# Patient Record
Sex: MALE | Race: OTHER | HISPANIC OR LATINO | Employment: FULL TIME | URBAN - METROPOLITAN AREA
[De-identification: names, ages, dates, MRNs, and addresses within clinical notes are randomized per-mention and may not be internally consistent; named-entity substitution may affect disease eponyms.]

---
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
## 2018-04-22 ENCOUNTER — HOSPITAL ENCOUNTER (EMERGENCY)
Facility: HOSPITAL | Age: 18
Discharge: HOME | End: 2018-04-22
Attending: EMERGENCY MEDICINE | Admitting: EMERGENCY MEDICINE
Payer: COMMERCIAL

## 2018-04-22 VITALS
HEART RATE: 48 BPM | SYSTOLIC BLOOD PRESSURE: 113 MMHG | TEMPERATURE: 97.9 F | WEIGHT: 130 LBS | DIASTOLIC BLOOD PRESSURE: 56 MMHG | RESPIRATION RATE: 18 BRPM | OXYGEN SATURATION: 100 %

## 2018-04-22 DIAGNOSIS — L02.01 FACIAL ABSCESS: Primary | ICD-10-CM

## 2018-04-22 PROCEDURE — 99282 EMERGENCY DEPT VISIT SF MDM: CPT

## 2018-04-22 RX ORDER — LIDOCAINE HYDROCHLORIDE AND EPINEPHRINE 10; 10 MG/ML; UG/ML
1 INJECTION, SOLUTION INFILTRATION; PERINEURAL ONCE
Status: COMPLETED | OUTPATIENT
Start: 2018-04-22 | End: 2018-04-22

## 2018-04-22 RX ORDER — DOXYCYCLINE HYCLATE 100 MG/1
100 CAPSULE ORAL 2 TIMES DAILY
Qty: 14 CAPSULE | Refills: 0 | Status: SHIPPED | OUTPATIENT
Start: 2018-04-22 | End: 2018-04-29

## 2018-04-22 RX ADMIN — LIDOCAINE HYDROCHLORIDE AND EPINEPHRINE 1 ML: 10; 10 INJECTION, SOLUTION INFILTRATION; PERINEURAL at 12:20

## 2018-04-22 NOTE — Clinical Note
Ac Colon discharge to home/self care  Condition at discharge: Good    Return precautions were discussed with patient  Patient understands when to return to  Emergency department  Patient agrees to discharge plan and follow up care

## 2018-04-22 NOTE — DISCHARGE INSTRUCTIONS
Abscess   WHAT YOU NEED TO KNOW:   A warm compress may help your abscess drain  Your healthcare provider may make a cut in the abscess so it can drain  You may need surgery to remove an abscess that is on your hands or buttocks  DISCHARGE INSTRUCTIONS:   Return to the emergency department if:   · The area around your abscess becomes very painful, warm, or has red streaks  · You have a fever and chills  · Your heart is beating faster than usual      · You feel faint or confused  Contact your healthcare provider if:   · Your abscess gets bigger or does not get better  · Your abscess returns  · You have questions or concerns about your condition or care  Medicines: You may  need any of the following:  · Antibiotics  help treat a bacterial infection  · Acetaminophen  decreases pain and fever  It is available without a doctor's order  Ask how much to take and how often to take it  Follow directions  Acetaminophen can cause liver damage if not taken correctly  · NSAIDs , such as ibuprofen, help decrease swelling, pain, and fever  This medicine is available with or without a doctor's order  NSAIDs can cause stomach bleeding or kidney problems in certain people  If you take blood thinner medicine, always ask your healthcare provider if NSAIDs are safe for you  Always read the medicine label and follow directions  · Take your medicine as directed  Contact your healthcare provider if you think your medicine is not helping or if you have side effects  Tell him or her if you are allergic to any medicine  Keep a list of the medicines, vitamins, and herbs you take  Include the amounts, and when and why you take them  Bring the list or the pill bottles to follow-up visits  Carry your medicine list with you in case of an emergency  Self-care:   · Apply a warm compress to your abscess  This will help it open and drain  Wet a washcloth in warm, but not hot, water  Apply the compress for 10 minutes  Repeat this 4 times each day  Do not  press on an abscess or try to open it with a needle  You may push the bacteria deeper or into your blood  · Do not share your clothes, towels, or sheets with anyone  This can spread the infection to others  · Wash your hands often  This can help prevent the spread of germs  Use soap and water or an alcohol-based hand rub  Care for your wound after it is drained:   · Care for your wound as directed  If your healthcare provider says it is okay, carefully remove the bandage and gauze packing  You may need to soak the gauze to get it out of your wound  Clean your wound and the area around it as directed  Dry the area and put on new, clean bandages  Change your bandages when they get wet or dirty  · Ask your healthcare provider how to change the gauze in your wound  Keep track of how many pieces of gauze are placed inside the wound  Do not put too much packing in the wound  Do not pack the gauze too tightly in your wound  Follow up with your healthcare provider in 1 to 3 days: You may need to have your packing removed or your bandage changed  Write down your questions so you remember to ask them during your visits  © 2017 [address]  Information is for End User's use only and may not be sold, redistributed or otherwise used for commercial purposes  All illustrations and images included in CareNotes® are the copyrighted property of [employer] , [employer]  or [employer]  The above information is an  only  It is not intended as medical advice for individual conditions or treatments  Talk to your doctor, nurse or pharmacist before following any medical regimen to see if it is safe and effective for you

## 2018-04-22 NOTE — ED ATTENDING ATTESTATION
Cassandra Lantigua MD, saw and evaluated the patient  I have discussed the patient with the resident/non-physician practitioner and agree with the resident's/non-physician practitioner's findings, Plan of Care, and MDM as documented in the resident's/non-physician practitioner's note, except where noted  All available labs and Radiology studies were reviewed  At this point I agree with the current assessment done in the Emergency Department  I have conducted an independent evaluation of this patient a history and physical is as follows:Tender mass right cheek   Abscess R cheek      Critical Care Time  CritCare Time    Procedures

## 2018-12-17 ENCOUNTER — APPOINTMENT (EMERGENCY)
Dept: RADIOLOGY | Facility: HOSPITAL | Age: 18
End: 2018-12-17

## 2018-12-17 ENCOUNTER — HOSPITAL ENCOUNTER (EMERGENCY)
Facility: HOSPITAL | Age: 18
Discharge: HOME | End: 2018-12-17
Attending: EMERGENCY MEDICINE | Admitting: EMERGENCY MEDICINE

## 2018-12-17 VITALS
DIASTOLIC BLOOD PRESSURE: 70 MMHG | BODY MASS INDEX: 18.68 KG/M2 | TEMPERATURE: 99.5 F | HEIGHT: 70 IN | SYSTOLIC BLOOD PRESSURE: 124 MMHG | WEIGHT: 130.5 LBS | HEART RATE: 74 BPM | RESPIRATION RATE: 18 BRPM | OXYGEN SATURATION: 100 %

## 2018-12-17 DIAGNOSIS — S62.102A LEFT WRIST FRACTURE, CLOSED, INITIAL ENCOUNTER: Primary | ICD-10-CM

## 2018-12-17 PROCEDURE — 99284 EMERGENCY DEPT VISIT MOD MDM: CPT

## 2018-12-17 PROCEDURE — 96374 THER/PROPH/DIAG INJ IV PUSH: CPT

## 2018-12-17 PROCEDURE — 73130 X-RAY EXAM OF HAND: CPT

## 2018-12-17 PROCEDURE — 96376 TX/PRO/DX INJ SAME DRUG ADON: CPT

## 2018-12-17 PROCEDURE — 96375 TX/PRO/DX INJ NEW DRUG ADDON: CPT

## 2018-12-17 RX ORDER — KETOROLAC TROMETHAMINE 30 MG/ML
30 INJECTION, SOLUTION INTRAMUSCULAR; INTRAVENOUS ONCE
Status: COMPLETED | OUTPATIENT
Start: 2018-12-17 | End: 2018-12-17

## 2018-12-17 RX ORDER — OXYCODONE HYDROCHLORIDE AND ACETAMINOPHEN 5; 325 MG/1; MG/1
1 TABLET ORAL EVERY 4 HOURS PRN
Qty: 15 TABLET | Refills: 0 | Status: SHIPPED | OUTPATIENT
Start: 2018-12-17 | End: 2018-12-20 | Stop reason: ALTCHOICE

## 2018-12-17 RX ORDER — MORPHINE SULFATE 4 MG/ML
4 INJECTION, SOLUTION INTRAMUSCULAR; INTRAVENOUS ONCE
Status: COMPLETED | OUTPATIENT
Start: 2018-12-17 | End: 2018-12-17

## 2018-12-17 RX ORDER — IBUPROFEN 600 MG/1
600 TABLET ORAL EVERY 6 HOURS PRN
Qty: 30 TABLET | Refills: 0 | Status: SHIPPED | OUTPATIENT
Start: 2018-12-17 | End: 2019-04-08

## 2018-12-17 RX ADMIN — MORPHINE SULFATE 4 MG: 4 INJECTION INTRAVENOUS at 22:14

## 2018-12-17 RX ADMIN — MORPHINE SULFATE 4 MG: 4 INJECTION INTRAVENOUS at 21:31

## 2018-12-17 RX ADMIN — KETOROLAC TROMETHAMINE 30 MG: 30 INJECTION, SOLUTION INTRAMUSCULAR at 22:09

## 2018-12-18 NOTE — ED NOTES
Sugar Tong splint applied by Dr Wen Comes to pt 's left lower hand/wrist/arm     Kajal Murrieta RN  12/17/18 0367

## 2018-12-18 NOTE — DISCHARGE INSTRUCTIONS
Arm Fracture in Adults   WHAT YOU NEED TO KNOW:   An arm fracture is a crack or break in one or more of the bones in your arm  An arm fracture may be caused by a fall onto an outstretched hand  It may also be caused by trauma from a car accident or a sports injury  Osteoporosis (brittle bones) can increase your risk for a fracture  DISCHARGE INSTRUCTIONS:   Seek care immediately if:   · The pain in your injured arm does not get better or gets worse, even after you rest and take medicine  · Your injured arm, hand, or fingers feel numb  · Your arm is swollen, red, and feels warm  · Your skin over the arm fracture is swollen, cold, or pale  · You cannot move your arm, hand, or fingers  Contact your healthcare provider if:   · You have a fever  · Your brace or splint becomes wet, damaged, comes off  · You have questions or concerns about your injury, treatment, or care  Medicines:   · NSAIDs , such as ibuprofen, help decrease swelling and pain  This medicine is available with or without a doctor's order  NSAIDs can cause stomach bleeding or kidney problems in certain people  If you take blood thinner medicine, always ask your healthcare provider if NSAIDs are safe for you  Always read the medicine label and follow directions  · Acetaminophen  decreases pain  It is available without a doctor's order  Ask how much to take and how often to take it  Follow directions  Acetaminophen can cause liver damage if not taken correctly  · Prescription pain medicine  may be given  Ask how to take this medicine safely  · Take your medicine as directed  Contact your healthcare provider if you think your medicine is not helping or if you have side effects  Tell him or her if you are allergic to any medicine  Keep a list of the medicines, vitamins, and herbs you take  Include the amounts, and when and why you take them  Bring the list or the pill bottles to follow-up visits   Carry your medicine list with you in case of an emergency  Follow up with your healthcare provider within 1 week: You may need to see a bone specialist within 3 to 4 days if you need surgery or further treatment for your arm fracture  Write down your questions so you remember to ask them during your visits  Rest:  You should rest your arm as much as possible  Ask your healthcare provider when you can put pressure or weight on your arm  Also ask when you can return to sports or vigorous exercises  Ice:  Apply ice on your arm for 15 to 20 minutes every hour or as directed  Use an ice pack, or put crushed ice in a plastic bag  Cover it with a towel  Ice helps prevent tissue damage and decreases swelling and pain  Elevate:  Elevate your arm above the level of your heart as often as you can  This will help decrease swelling and pain  Prop your arm on pillows or blankets to keep it elevated comfortably  Care for your cast or splint:  Ask your healthcare provider when it is okay to bathe  Do not get your cast or splint wet  Before you take a bath or shower, cover your cast or splint with a plastic bag  Tape the bag to your skin to help keep water out  Hold your arm away from the water in case the bag leaks  · Check the skin around your cast or splint each day for any redness or open skin  · Do not use a sharp or pointed object to scratch your skin under the cast or splint  Physical therapy:  A physical therapist teaches you exercises to help improve movement and strength, and to decrease pain  © 2017 [address] Information is for End User's use only and may not be sold, redistributed or otherwise used for commercial purposes  All illustrations and images included in CareNotes® are the copyrighted property of [employer]  or [address]  The above information is an  only  It is not intended as medical advice for individual conditions or treatments   Talk to your doctor, nurse or pharmacist before following any medical regimen to see if it is safe and effective for you

## 2018-12-18 NOTE — ED PROVIDER NOTES
Pt Name: Zachery Ferreira  MRN: [id number]  [place] 2000  Age/Sex: 19 y o  male  Date of evaluation: 12/17/2018  PCP: No primary care provider on file  CHIEF COMPLAINT    Chief Complaint   Patient presents with    Wrist Injury     I was skate boarding and slipped and caught myself and all my weight went onto my left hand  HPI    Doolres Gann presents to the Emergency Department complaining of left wrist pain after falling while skateboarding  HPI      Past Medical and Surgical History    Past Medical History:   Diagnosis Date    Asthma        Past Surgical History:   Procedure Laterality Date    NO PAST SURGERIES         History reviewed  No pertinent family history  Social History   Substance Use Topics    Smoking status: Never Smoker    Smokeless tobacco: Never Used    Alcohol use No              Allergies    No Known Allergies    Home Medications    Prior to Admission medications    Not on File           Review of Systems    Review of Systems   Constitutional: Negative for activity change, appetite change, chills, fatigue and fever  HENT: Negative for congestion, rhinorrhea, sinus pressure, sneezing, sore throat and trouble swallowing  Eyes: Negative for photophobia and visual disturbance  Respiratory: Negative for chest tightness, shortness of breath and wheezing  Cardiovascular: Negative for chest pain and leg swelling  Gastrointestinal: Negative for abdominal distention, abdominal pain, constipation, diarrhea, nausea and vomiting  Endocrine: Negative for polydipsia, polyphagia and polyuria  Genitourinary: Negative for decreased urine volume, difficulty urinating, dysuria, flank pain, frequency and urgency  Musculoskeletal: Negative for back pain, gait problem, joint swelling and neck pain  Skin: Negative for color change, pallor and rash  Allergic/Immunologic: Negative for immunocompromised state     Neurological: Negative for seizures, syncope, speech difficulty, weakness, light-headedness and headaches  Psychiatric/Behavioral: Negative for confusion  All other systems reviewed and are negative  All other systems reviewed and negative  Physical Exam      ED Triage Vitals [12/17/18 2053]   Temperature Pulse Respirations Blood Pressure SpO2   99 5 °F (37 5 °C) 80 20 135/74 100 %      Temp Source Heart Rate Source Patient Position - Orthostatic VS BP Location FiO2 (%)   Tympanic Monitor Sitting Right arm --      Pain Score       Worst Possible Pain               Physical Exam   Constitutional: He is oriented to person, place, and time  He appears well-developed and well-nourished  No distress  HENT:   Head: Normocephalic and atraumatic  Nose: Nose normal    Mouth/Throat: Oropharynx is clear and moist    Eyes: Pupils are equal, round, and reactive to light  Conjunctivae and EOM are normal    Neck: Normal range of motion  Neck supple  Cardiovascular: Normal rate, regular rhythm and normal heart sounds  Exam reveals no gallop and no friction rub  No murmur heard  Pulmonary/Chest: Effort normal and breath sounds normal  No respiratory distress  He has no wheezes  He has no rales  Abdominal: Soft  Bowel sounds are normal  There is no tenderness  There is no rebound and no guarding  Musculoskeletal:        Left wrist: He exhibits decreased range of motion, tenderness, bony tenderness, swelling and deformity  Left hand: He exhibits tenderness, bony tenderness, deformity and swelling  Normal sensation noted  Decreased sensation is not present in the ulnar distribution, is not present in the medial redistribution and is not present in the radial distribution  Normal strength noted  He exhibits no finger abduction, no thumb/finger opposition and no wrist extension trouble  Hands:  Neurological: He is alert and oriented to person, place, and time  Skin: Skin is warm and dry  He is not diaphoretic     Psychiatric: He has a normal mood and affect  His behavior is normal    Nursing note and vitals reviewed  Assessment and Plan    Ac Payton is a 19 y o  male who presents with obvious left wrist fracture  Physical examination remarkable for same  Differential diagnosis (not completely inclusive) includes associated dislocation/ hematma  Plan will be to perform diagnostic testing and treat symptomatically  MDM    Diagnostic Result      Labs: All labs reviewed and utilized in the medical decision making process    Radiology:    XR hand 3+ vw left   Final Result      Acute fractures of the distal left radius and ulna, as described above  Please see discussion  The images are available for clinical review  The study was marked in [facility] for immediate notification              Workstation performed: [id number]             All radiology studies independently viewed by me and interpreted by the radiologist     Procedure    Static Splint Application  Date/Time: 12/18/2018 10:38 AM  Performed by: Dorothy Grayson  Authorized by: Dorothy Grayson     Patient location:  ED  Procedure performed by emergency physician: Yes    Other Assisting Provider: Yes (comment) (cem husain)    Consent:     Consent obtained:  Verbal    Consent given by:  Patient  Indication:     Indications: fracture    Pre-procedure details:     Sensation:  Normal  Procedure details:     Laterality:  Left    Location:  Wrist    Wrist:  L wrist    Splint type:  Sugar tong    Supplies:  Cotton padding, elastic bandage and Ortho-Glass  Post-procedure details:     Pain:  Improved    Sensation:  Normal    Neurovascular Exam: skin pink, capillary refill <2 sec and normal pulses          CritCare Time      ED Course of Care and Re-Assessments        Medications   morphine (PF) 4 mg/mL injection 4 mg (4 mg Intravenous Given 12/17/18 2131)   morphine (PF) 4 mg/mL injection 4 mg (4 mg Intravenous Given 12/17/18 2214)   ketorolac (TORADOL) injection 30 mg (30 mg Intravenous Given 12/17/18 2209)           FINAL IMPRESSION    Final diagnoses:   Left wrist fracture, closed, initial encounter         DISPOSITION/PLAN    Time reflects when diagnosis was documented in both MDM as applicable and the Disposition within this note     Time User Action Codes Description Comment    12/17/2018 10:56 PM Kala Burton Add [R14 102A] Left wrist fracture, closed, initial encounter       ED Disposition     ED Disposition Condition Comment    Discharge  Ac Colon discharge to home/self care      Condition at discharge: Good        Follow-up Information     Follow up With Specialties Details Why Contact Info Additional [address] Heart Emergency Department Emergency Medicine Go to As needed, If symptoms worsen [address] [address]  [facility] Orthopedic Surgery Schedule an appointment as soon as possible for a visit  Adan Gupta [phone]  [address] [place] [address], [phone]    [facility] Heart Emergency Department Emergency Medicine   [address] [address]  [place]  [address] Heart Orthopedic Surgery Schedule an appointment as soon as possible for a visit  [address]  [address]  [address] [place] [address], [address], [place], [address]            PATIENT REFERRED TO:    [facility] Emergency Department  [address] [address] [phone]  Go to  As needed, If symptoms worsen    [address]  Adan Gupta [address]  [phone]  Schedule an appointment as soon as possible for a visit      [facility] Emergency Department  [address] [address]  [address]  [address] [phone]  Schedule an appointment as soon as possible for a visit        DISCHARGE MEDICATIONS:    Discharge Medication List as of 12/17/2018 10:58 PM      START taking these medications    Details   ibuprofen (MOTRIN) 600 mg tablet Take 1 tablet (600 mg total) by mouth every 6 (six) hours as needed for mild pain, Starting Mon 12/17/2018, Print      oxyCODONE-acetaminophen (PERCOCET) 5-325 mg per tablet Take 1 tablet by mouth every 4 (four) hours as needed for moderate pain for up to 10 days Max Daily Amount: 6 tablets, Starting Mon 12/17/2018, Until Thu 12/27/2018, Print             No discharge procedures on file           Kesha Adam, [address] [place] [address],   12/18/18 3163

## 2018-12-20 ENCOUNTER — OFFICE VISIT (OUTPATIENT)
Dept: OBGYN CLINIC | Facility: HOSPITAL | Age: 18
End: 2018-12-20

## 2018-12-20 ENCOUNTER — HOSPITAL ENCOUNTER (OUTPATIENT)
Dept: RADIOLOGY | Facility: HOSPITAL | Age: 18
Discharge: HOME | End: 2018-12-20

## 2018-12-20 VITALS
DIASTOLIC BLOOD PRESSURE: 72 MMHG | HEART RATE: 53 BPM | WEIGHT: 134 LBS | SYSTOLIC BLOOD PRESSURE: 134 MMHG | HEIGHT: 70 IN | BODY MASS INDEX: 19.18 KG/M2

## 2018-12-20 DIAGNOSIS — M25.532 LEFT WRIST PAIN: ICD-10-CM

## 2018-12-20 DIAGNOSIS — S52.502A CLOSED FRACTURE OF DISTAL ENDS OF LEFT RADIUS AND ULNA, INITIAL ENCOUNTER: ICD-10-CM

## 2018-12-20 DIAGNOSIS — S52.602A CLOSED FRACTURE OF DISTAL ENDS OF LEFT RADIUS AND ULNA, INITIAL ENCOUNTER: ICD-10-CM

## 2018-12-20 DIAGNOSIS — M25.532 LEFT WRIST PAIN: Primary | ICD-10-CM

## 2018-12-20 PROCEDURE — 73110 X-RAY EXAM OF WRIST: CPT

## 2018-12-20 PROCEDURE — 29075 APPL CST ELBW FNGR SHORT ARM: CPT | Performed by: PHYSICIAN ASSISTANT

## 2018-12-20 PROCEDURE — 99203 OFFICE O/P NEW LOW 30 MIN: CPT | Performed by: PHYSICIAN ASSISTANT

## 2018-12-20 RX ORDER — HYDROCODONE BITARTRATE AND ACETAMINOPHEN 5; 325 MG/1; MG/1
1 TABLET ORAL EVERY 8 HOURS PRN
Qty: 15 TABLET | Refills: 0 | Status: SHIPPED | OUTPATIENT
Start: 2018-12-20 | End: 2019-04-08

## 2018-12-20 NOTE — PATIENT INSTRUCTIONS
Call or go to ER immediately if any returning / worsening numbness  Cast Care   WHAT YOU NEED TO KNOW:   Cast care will help the cast dry and harden correctly, and then protect it until it comes off  Your cast may need up to 48 hours to dry and harden completely  Even after your cast hardens, it can be damaged  DISCHARGE INSTRUCTIONS:   Return to the emergency department if:   · Your cast breaks or gets damaged  · You see drainage, or your cast is stained or smells bad  · Your skin turns blue or pale  · Your skin tingles, burns, or is cold or numb  · You have severe pain that is getting worse and does not go away after you take pain medicine  · Your limb swells, or your cast looks or feels tighter than it was before  Contact your healthcare provider if:   · Something falls into your cast and gets stuck  · You have itching, pain, burning, or weakness where you have the cast      · You have a fever  · You have sores, blisters, or breaks on the skin around the edges of the cast     · You have questions or concerns about your condition or care  Follow up with your healthcare provider as directed: You will need to return to have your cast removed and your bones checked  Write down your questions so you remember to ask them during your visits  Care for your cast while it hardens:   · Protect the cast   Do not put weight on the cast  Do not bend, lean on, or hit the cast with anything  Use the palms of your hands when you move the cast  Do not use your fingers  Your fingers may leave marks on the cast as it dries  · Change positions often  Change your position every 2 hours to help the cast dry faster  Prop your cast on something soft, such as a pillow, to prevent a flat area on your cast      · Keep the cast dry  Tie plastic trash bags around your cast to keep it dry while you bathe  You may use a blow dryer on cool or the lowest heat setting to dry your cast if it gets wet   Do not use a high heat setting, because you may burn your skin  Certain casts can get wet  Ask if you have a waterproof cast   Care for your cast after it hardens:   · Check your cast every day  Contact your healthcare provider if you notice any cracks, dents, holes, or flaking on your cast      · Keep your cast clean and dry  Cover your cast with a towel when you eat  You may have a small piece of cast that can be removed to check on incisions under your cast  Make sure the small piece of cast is kept tightly closed  If your cast gets dirty, use a mild detergent and a damp washcloth to wipe off the outside of your cast  Continue to cover your cast with trash bags to keep it dry while you bathe  · Care for the edges of your cast   Cover the cast edges to keep them smooth  Use 4 inch pieces of waterproof tape  Place one end of the tape under the inside edge of your cast and fold it over to the outside surface  Overlap tape strips until the edges are completely covered  Change the tape as directed  Do not pull or repair any of the padding from inside the cast  This could cause blisters and sores on the skin under your cast      · Keep weight off your cast   Do not let anyone push down or lean on your cast  This may cause it to break  · Do not use sharp objects  Do not use a sharp or pointed object to scratch under your cast  This may cause wounds that can get infected, or you may lose the item inside the cast  If your skin itches, blow cool air under the cast  You may also gently scratch your skin outside the cast with a cloth  © 2017 [address] Information is for End User's use only and may not be sold, redistributed or otherwise used for commercial purposes  All illustrations and images included in CareNotes® are the copyrighted property of [employer]  or [employer]  The above information is an  only   It is not intended as medical advice for individual conditions or treatments  Talk to your doctor, nurse or pharmacist before following any medical regimen to see if it is safe and effective for you

## 2018-12-20 NOTE — PROGRESS NOTES
Assessment/Plan   Diagnoses and all orders for this visit:    Left wrist pain  -     XR wrist 3+ vw left; Future    Closed fracture of distal ends of left radius and ulna, initial encounter       -     Short arm cast       -     Since it remains non-displaced, will treat conservatively with a cast, but will schedule close clinical follow up because it is somewhat comminuted  -     Call immediately if any returning / worsening numbness       -     Follow up with Dr Rebeca Nick (pt request)      *Checked PDMP  No record of pt, but ER rx oxycodone #15 3 days ago  Subjective   Patient ID: He Epperson is a 19 y o  male  Vitals:    12/20/18 1106   BP: 134/72   Pulse: (!) 48     19yo male comes in for an evaluation of his left wrist   He was injured 12/17/18 when he fell off his skateboard and landed on his outstretched hand  He went to the ER where xrays showed a comminuted fracture of the distal radius and a small ulnar styloid avulsion  He was treated with a sugar tong splint  He c/o minimal, intermittent numbness in all 5 fingers  The pain is sharp in character, moderate in severity, pain does not radiate and is not associated with numbness  He lives near [facility] and requests to f/u there  The following portions of the patient's history were reviewed and updated as appropriate: allergies, current medications, past family history, past medical history, past social history, past surgical history and problem list     Review of Systems  Ortho Exam  Past Medical History:   Diagnosis Date    Asthma      Past Surgical History:   Procedure Laterality Date    NO PAST SURGERIES       History reviewed  No pertinent family history  Social History     Occupational History    Not on file       Social History Main Topics    Smoking status: Never Smoker    Smokeless tobacco: Never Used    Alcohol use No    Drug use: Yes     Types: Marijuana      Comment: Occasionally    Sexual activity: Not on file       Review of Systems   Constitutional: Negative  HENT: Negative  Eyes: Negative  Respiratory: Negative  Cardiovascular: Negative  Gastrointestinal: Negative  Endocrine: Negative  Genitourinary: Negative  Musculoskeletal: As below      Allergic/Immunologic: Negative  Neurological: Negative  Hematological: Negative  Psychiatric/Behavioral: Negative  Objective   Physical Exam    · Constitutional: Awake, Alert, Oriented  · Eyes: EOMI  · Psych: Mood and affect appropriate  · Heart: regular rate and rhythm  · Lungs: No audible wheezing  · Abdomen: soft  · Lymph: no lymphedema   left wrist:  - Appearance   Swelling: significant wrist and dorsal hand, no discoloration, no ecchymosis and no erythema  Possible deformity dorsoradial wrist   - Palpation  o +++ wrist tenderness  - ROM  o not examined due to fracture status  - Motor  o not examined due to fracture status  - Special Tests  o normal sensation of hand and arm, no CTS  - NVI distally    I have personally reviewed pertinent films in PACS and my interpretation is Unchanged fracture  Non-displaced        Cast application  Date/Time: 12/20/2018 11:46 AM  Performed by: Cori Michael  Authorized by: Cori Michael     Consent:     Consent obtained:  Verbal    Consent given by:  Patient    Risks discussed:  Discoloration, numbness, pain and swelling    Alternatives discussed:  No treatment  Pre-procedure details:     Sensation:  Normal  Procedure details:     Laterality:  Left    Location:  Wrist    Wrist:  L wristCast type:  Short arm    Supplies:  Cotton padding and fiberglass  Post-procedure details:     Pain:  Improved    Sensation:  Normal    Patient tolerance of procedure:   Tolerated well, no immediate complications

## 2019-01-22 ENCOUNTER — OFFICE VISIT (OUTPATIENT)
Dept: OBGYN CLINIC | Facility: CLINIC | Age: 19
End: 2019-01-22

## 2019-01-22 ENCOUNTER — HOSPITAL ENCOUNTER (OUTPATIENT)
Dept: RADIOLOGY | Facility: HOSPITAL | Age: 19
Discharge: HOME | End: 2019-01-22
Attending: ORTHOPAEDIC SURGERY

## 2019-01-22 VITALS — HEIGHT: 70 IN | WEIGHT: 134 LBS | BODY MASS INDEX: 19.18 KG/M2

## 2019-01-22 DIAGNOSIS — M25.532 PAIN IN LEFT WRIST: ICD-10-CM

## 2019-01-22 DIAGNOSIS — S52.602D: Primary | ICD-10-CM

## 2019-01-22 DIAGNOSIS — S52.502D: Primary | ICD-10-CM

## 2019-01-22 PROCEDURE — 73110 X-RAY EXAM OF WRIST: CPT

## 2019-01-22 PROCEDURE — 99213 OFFICE O/P EST LOW 20 MIN: CPT | Performed by: ORTHOPAEDIC SURGERY

## 2019-01-22 NOTE — PATIENT INSTRUCTIONS
Plan:  I removed his cast now and placed him in  a cock-up wrist splint which he must wear full time for another 2 weeks  He may take it off for bathing and then put the splint back on  I do not want him doing any heavy lifting yet as the fracture is not totally healed  He may continue to use ice for 15 min 4 times a day if needed for pain and can take Advil, Aleve, or Tylenol if needed  I encouraged him strongly to get his fingers moving into a fist 5 times every hour while awake to prevent stiffness    I will see him in 2 weeks for splint off and repeat x-ray

## 2019-01-22 NOTE — PROGRESS NOTES
Chief Complaint   Patient presents with    Left Wrist - Fracture         Assessment:  Fracture left distal radius and ulnar styloid    Plan:  I removed his cast now and placed him in  a cock-up wrist splint which he must wear full time for another 2 weeks  He may take it off for bathing and then put the splint back on  I do not want him doing any heavy lifting yet as the fracture is not totally healed  He may continue to use ice for 15 min 4 times a day if needed for pain and can take Advil, Aleve, or Tylenol if needed  I encouraged him strongly to get his fingers moving into a fist 5 times every hour while awake to prevent stiffness  I will see him in 2 weeks for splint off and repeat x-ray    HPI:  This 64-year-old Bangl[place] male injured his left wrist skateboarding and saw our urgent care center  He was casted for the last 5 weeks and is doing well in the cast   He has no pain, no numbness, no tingling or paresthesias  He is right-hand dominant  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  Past medical history, family history,  social history, medication history, and allergies are reviewed  Please see HPI for pertinent review of systems  All other systems reviewed are negative     Exam:   Ht 5' 10" (1 778 m)   Wt 60 8 kg (134 lb)   BMI 19 23 kg/m²   I removed the short-arm cast today from his left wrist   He was  over the distal radius  There was no swelling, ecchymosis or deformity  He had limited motion from immobilization  Radial pulse was intact  Normal sensation and motion in all 5 fingers  Elbow motion is preserved  No evidence of cellulitis in forearm or hand       Studies Reviewed:  X-rays of his left wrist were personally reviewed  The fracture remains essentially nondisplaced there is some early sclerosis but the fracture line is still clearly visible    There is no significant dorsal tilt or shortening

## 2019-02-06 ENCOUNTER — OFFICE VISIT (OUTPATIENT)
Dept: OBGYN CLINIC | Facility: CLINIC | Age: 19
End: 2019-02-06

## 2019-02-06 ENCOUNTER — HOSPITAL ENCOUNTER (OUTPATIENT)
Dept: RADIOLOGY | Facility: HOSPITAL | Age: 19
Discharge: HOME | End: 2019-02-06
Attending: ORTHOPAEDIC SURGERY

## 2019-02-06 VITALS
HEIGHT: 70 IN | SYSTOLIC BLOOD PRESSURE: 110 MMHG | BODY MASS INDEX: 19.18 KG/M2 | DIASTOLIC BLOOD PRESSURE: 70 MMHG | HEART RATE: 69 BPM | WEIGHT: 134 LBS

## 2019-02-06 DIAGNOSIS — S52.602D: ICD-10-CM

## 2019-02-06 DIAGNOSIS — S52.502D: Primary | ICD-10-CM

## 2019-02-06 DIAGNOSIS — S52.502D: ICD-10-CM

## 2019-02-06 DIAGNOSIS — S52.602D: Primary | ICD-10-CM

## 2019-02-06 PROCEDURE — 73110 X-RAY EXAM OF WRIST: CPT

## 2019-02-06 PROCEDURE — 99213 OFFICE O/P EST LOW 20 MIN: CPT | Performed by: ORTHOPAEDIC SURGERY

## 2019-02-06 NOTE — PATIENT INSTRUCTIONS
Plan:   I am happy with the progressive healing seen by x-ray and clinically  He is no longer tender over the fracture site  I stopped to splint  I cautioned him that it may be another month or so until he regains full pain-free motion  For now I would like him to soak the hand and wrist in warm soapy water for 5 minutes twice a day for the next few weeks to start range of motion  I do not want him doing heavy lifting, punching, or overly heavy activities until he regains full pain-free range of motion  I showed him exercises he should do to promote wrist motion  He can use ice for 15 minutes 4 times a day and take Advil, Aleve, or Tylenol if needed for pain  I sent him back to his family doctor for routine care and would be  happy to see him back again if he has further issues

## 2019-02-06 NOTE — PROGRESS NOTES
Chief Complaint   Patient presents with    Left Wrist - Follow-up         Assessment:  Fracture left distal radius and ulnar styloid    Plan:   I am happy with the progressive healing seen by x-ray and clinically  He is no longer tender over the fracture site  I stopped to splint  I cautioned him that it may be another month or so until he regains full pain-free motion  For now I would like him to soak the hand and wrist in warm soapy water for 5 minutes twice a day for the next few weeks to start range of motion  I do not want him doing heavy lifting, punching, or overly heavy activities until he regains full pain-free range of motion  I showed him exercises he should do to promote wrist motion  He can use ice for 15 minutes 4 times a day and take Advil, Aleve, or Tylenol if needed for pain  I sent him back to his family doctor for routine care and would be  happy to see him back again if he has further issues  HPI:  This 28-year-old [place] male injured his left wrist skateboarding on 12/17/2018 and saw our [facility] urgent care center  He was casted for the last 5 weeks and is doing well in the cast   He has no pain, no numbness, no tingling or paresthesias  He is right-hand dominant  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  He has been in a cock-up wrist splint for the last 2 weeks as he has continued to work  He returns now on 02/06/2019  He is now 7 half weeks after fracture of the left distal radius and ulnar styloid  He has been wearing a splint and is not complaining of pain, numbness, tingling, or paresthesias in this hand    Past medical history, family history,  social history, medication history, and allergies are reviewed  Please see HPI for pertinent review of systems    All other systems reviewed are negative     Exam:   /70   Pulse 69   Ht 5' 10" (1 778 m)   Wt 60 8 kg (134 lb)   BMI 19 23 kg/m²   I removed the splint today from his left wrist   He was no longer tender over the distal left radius  There was no swelling, ecchymosis or redness  He had mild limitation of full volar and dorsiflexion secondary to the immobilization  He was nontender now over the distal radius and ulnar styloid  There was no deformity of the wrist  Radial pulse was intact  He had normal sensation and motion in all 5 fingers  Elbow motion in flexion and extension was normal   There was no cellulitis in forearm or hand       Studies Reviewed:  X-rays of his left wrist were personally reviewed  The fracture remains essentially nondisplaced there is some early sclerosis but the fracture line is still clearly visible  There is no significant dorsal tilt or shortening  Repeat x-rays done on 02/06/2019 were reviewed and the fracture line is just about closed down  The fracture itself is only faintly visible  Position is anatomic

## 2019-04-08 ENCOUNTER — HOSPITAL ENCOUNTER (EMERGENCY)
Facility: HOSPITAL | Age: 19
Discharge: HOME | End: 2019-04-08
Attending: EMERGENCY MEDICINE | Admitting: EMERGENCY MEDICINE

## 2019-04-08 VITALS
BODY MASS INDEX: 19.11 KG/M2 | RESPIRATION RATE: 18 BRPM | HEART RATE: 71 BPM | SYSTOLIC BLOOD PRESSURE: 147 MMHG | OXYGEN SATURATION: 99 % | WEIGHT: 133.16 LBS | TEMPERATURE: 98 F | DIASTOLIC BLOOD PRESSURE: 66 MMHG

## 2019-04-08 DIAGNOSIS — N34.2 URETHRITIS: Primary | ICD-10-CM

## 2019-04-08 LAB
BACTERIA UR QL AUTO: ABNORMAL /HPF
BILIRUB UR QL STRIP: NEGATIVE
CLARITY UR: CLEAR
COLOR UR: ABNORMAL
GLUCOSE UR STRIP-MCNC: NEGATIVE MG/DL
HGB UR QL STRIP.AUTO: NEGATIVE
KETONES UR STRIP-MCNC: NEGATIVE MG/DL
LEUKOCYTE ESTERASE UR QL STRIP: NEGATIVE
MUCOUS THREADS UR QL AUTO: ABNORMAL
NITRITE UR QL STRIP: NEGATIVE
NON-SQ EPI CELLS URNS QL MICRO: ABNORMAL /HPF
PH UR STRIP.AUTO: 7 [PH] (ref 4.5–8)
PROT UR STRIP-MCNC: ABNORMAL MG/DL
RBC #/AREA URNS AUTO: ABNORMAL /HPF
SP GR UR STRIP.AUTO: >=1.03 (ref 1–1.03)
UROBILINOGEN UR QL STRIP.AUTO: 0.2 E.U./DL
WBC #/AREA URNS AUTO: ABNORMAL /HPF

## 2019-04-08 PROCEDURE — 96372 THER/PROPH/DIAG INJ SC/IM: CPT

## 2019-04-08 PROCEDURE — 99283 EMERGENCY DEPT VISIT LOW MDM: CPT

## 2019-04-08 PROCEDURE — 99284 EMERGENCY DEPT VISIT MOD MDM: CPT | Performed by: PHYSICIAN ASSISTANT

## 2019-04-08 PROCEDURE — 87591 N.GONORRHOEAE DNA AMP PROB: CPT | Performed by: PHYSICIAN ASSISTANT

## 2019-04-08 PROCEDURE — 87491 CHLMYD TRACH DNA AMP PROBE: CPT | Performed by: PHYSICIAN ASSISTANT

## 2019-04-08 PROCEDURE — 81001 URINALYSIS AUTO W/SCOPE: CPT

## 2019-04-08 RX ORDER — AZITHROMYCIN 250 MG/1
1000 TABLET, FILM COATED ORAL ONCE
Status: COMPLETED | OUTPATIENT
Start: 2019-04-08 | End: 2019-04-08

## 2019-04-08 RX ADMIN — LIDOCAINE HYDROCHLORIDE 250 MG: 10 INJECTION, SOLUTION EPIDURAL; INFILTRATION; INTRACAUDAL; PERINEURAL at 14:11

## 2019-04-08 RX ADMIN — AZITHROMYCIN 1000 MG: 250 TABLET, FILM COATED ORAL at 14:10

## 2019-04-09 LAB
C TRACH DNA SPEC QL NAA+PROBE: POSITIVE
N GONORRHOEA DNA SPEC QL NAA+PROBE: NEGATIVE

## 2023-01-06 ENCOUNTER — OFFICE VISIT (OUTPATIENT)
Dept: FAMILY MEDICINE CLINIC | Facility: CLINIC | Age: 23
End: 2023-01-06

## 2023-01-06 VITALS
DIASTOLIC BLOOD PRESSURE: 70 MMHG | SYSTOLIC BLOOD PRESSURE: 120 MMHG | HEART RATE: 80 BPM | BODY MASS INDEX: 22.22 KG/M2 | WEIGHT: 150 LBS | HEIGHT: 69 IN | RESPIRATION RATE: 16 BRPM | OXYGEN SATURATION: 98 % | TEMPERATURE: 98.5 F

## 2023-01-06 DIAGNOSIS — S52.602D: ICD-10-CM

## 2023-01-06 DIAGNOSIS — S52.502D: ICD-10-CM

## 2023-01-06 DIAGNOSIS — Z76.89 ENCOUNTER TO ESTABLISH CARE WITH NEW DOCTOR: Primary | ICD-10-CM

## 2023-01-06 DIAGNOSIS — Z23 ENCOUNTER FOR IMMUNIZATION: ICD-10-CM

## 2023-01-06 NOTE — ASSESSMENT & PLAN NOTE
Current Concerns: clearance letter for [employer]     PMH: left radial and ulnar fracture  Medications: None  Allergies: None  Surg Hx: None  Social Hx:   Alcohol: None    Smoking: Vape    Drugs: None   Sexually Active: currently, female, one partner                    Contraceptive: No barrier contraceptive   Education level: High school diploma    Occupation:     Sleep: No issues currently    Eating: No issues currently     Fam Hx: No sig family hx

## 2023-01-06 NOTE — LETTER
To whom this may concern,         Ildefonso Johan is currently under my professional care and he was seen in my office on 1/6/2023  He had been diagnosed with a fracture of left radius, distal, with ulna, closed, in 2018, which has since healed without any current deficits  Mr Yo Johnson has full range of motion, and on examination did not show any problems with wrist strength  If you have any further questions please feel free to call and discuss with me at the office           Sincerely,               India Parks MD

## 2023-01-06 NOTE — ASSESSMENT & PLAN NOTE
2018 s/p fall off skateboard  Patient had been initially seen by ortho  Currently patient has no deficit, has full ROM and strength 5/5   Patient can be cleared for [facility]

## 2023-01-06 NOTE — PROGRESS NOTES
Name: Ayo Lantigua      : 2000      MRN: [id number]  Encounter Provider: Inocencio Lopez MD  Encounter Date: 2023   Encounter department: [address] [place] [address]     1  Encounter to establish care with new doctor  Assessment & Plan:  Current Concerns: clearance letter for [employer]     PMH: left radial and ulnar fracture  Medications: None  Allergies: None  Surg Hx: None  Social Hx:   Alcohol: None    Smoking: Vape    Drugs: None   Sexually Active: currently, female, one partner                    Contraceptive: No barrier contraceptive   Education level: High school diploma    Occupation:     Sleep: No issues currently    Eating: No issues currently     Fam Hx: No sig family hx       2  Encounter for immunization  -     influenza vaccine, quadrivalent, 0 5 mL, preservative-free, for adult and pediatric patients 6 mos+ (AFLURIA, FLUARIX, FLULAVAL, FLUZONE)    3  Fracture of radius, distal, with ulna, closed, left, with routine healing, subsequent encounter  Assessment & Plan:   s/p fall off skateboard  Patient had been initially seen by ortho  Currently patient has no deficit, has full ROM and strength 5/5   Patient can be cleared for  service         Depression Screening and Follow-up Plan: Patient was screened for depression during today's encounter  They screened negative with a PHQ-2 score of 0  Subjective     Ac Payton is a 23 y o  male presents today to establish care  Current concerns include getting cleared for his  enrollment     Review of Systems   Constitutional: Negative for chills and fever  HENT: Negative for ear pain and sore throat  Eyes: Negative for pain and visual disturbance  Respiratory: Negative for cough and shortness of breath  Cardiovascular: Negative for chest pain and palpitations  Gastrointestinal: Negative for abdominal pain and vomiting     Genitourinary: Negative for dysuria and hematuria  Musculoskeletal: Negative for arthralgias and back pain  Skin: Negative for color change and rash  Neurological: Negative for seizures and syncope  All other systems reviewed and are negative  Past Medical History:   Diagnosis Date   • Asthma      Past Surgical History:   Procedure Laterality Date   • NO PAST SURGERIES       No family history on file  Social History     Socioeconomic History   • Marital status: /Civil Union     Spouse name: None   • Number of children: None   • Years of education: None   • Highest education level: None   Occupational History   • None   Tobacco Use   • Smoking status: Never   • Smokeless tobacco: Never   Substance and Sexual Activity   • Alcohol use: No   • Drug use: Yes     Types: Marijuana     Comment: Occasionally   • Sexual activity: None   Other Topics Concern   • None   Social History Narrative   • None     Social Determinants of Health     Financial Resource Strain: Low Risk    • Difficulty of Paying Living Expenses: Not hard at all   Food Insecurity: No Food Insecurity   • Worried About Running Out of Food in the Last Year: Never true   • Ran Out of Food in the Last Year: Never true   Transportation Needs: No Transportation Needs   • Lack of Transportation (Medical): No   • Lack of Transportation (Non-Medical): No   Physical Activity: Not on file   Stress: Not on file   Social Connections: Not on file   Intimate Partner Violence: Not on file   Housing Stability: Not on file     No current outpatient medications on file prior to visit       No Known Allergies  Immunization History   Administered Date(s) Administered   • Influenza, injectable, quadrivalent, preservative free 0 5 mL 01/06/2023   • Rabies, Intramuscular 04/20/2017, 04/23/2017, 04/27/2017   • Tdap 02/08/2016       Objective     /70 (BP Location: Right arm, Patient Position: Sitting, Cuff Size: Standard)   Pulse 80   Temp 98 5 °F (36 9 °C) (Temporal)   Resp 16   Ht 5' 9" (1 753 m)   Wt 68 kg (150 lb)   SpO2 98%   BMI 22 15 kg/m²     Physical Exam  Constitutional:       Appearance: Normal appearance  HENT:      Head: Normocephalic and atraumatic  Cardiovascular:      Rate and Rhythm: Normal rate and regular rhythm  Pulses: Normal pulses  Heart sounds: Normal heart sounds  Pulmonary:      Effort: Pulmonary effort is normal       Breath sounds: Normal breath sounds  Musculoskeletal:      Right wrist: Normal  No swelling, deformity, tenderness or bony tenderness  Normal range of motion  Normal pulse  Left wrist: Normal  No swelling, deformity, tenderness or bony tenderness  Normal range of motion  Normal pulse  Right hand: Normal  No swelling, deformity, lacerations or tenderness  Normal range of motion  Normal strength  Normal sensation  Normal capillary refill  Normal pulse  Left hand: Normal  No swelling, deformity, lacerations or tenderness  Normal range of motion  Normal strength  Normal sensation  Normal capillary refill  Normal pulse  Skin:     General: Skin is warm  Neurological:      Mental Status: He is alert and oriented to person, place, and time     Psychiatric:         Mood and Affect: Mood normal        Lindajo Alpers, MD

## 2023-01-27 ENCOUNTER — OFFICE VISIT (OUTPATIENT)
Dept: FAMILY MEDICINE CLINIC | Facility: CLINIC | Age: 23
End: 2023-01-27

## 2023-01-27 VITALS
TEMPERATURE: 98.5 F | OXYGEN SATURATION: 98 % | RESPIRATION RATE: 18 BRPM | DIASTOLIC BLOOD PRESSURE: 80 MMHG | BODY MASS INDEX: 22.66 KG/M2 | HEIGHT: 69 IN | WEIGHT: 153 LBS | HEART RATE: 74 BPM | SYSTOLIC BLOOD PRESSURE: 122 MMHG

## 2023-01-27 DIAGNOSIS — Z87.09 HISTORY OF ASTHMA: Primary | ICD-10-CM

## 2023-01-27 NOTE — PROGRESS NOTES
Name: Danyell Rosenthal      : 2000      MRN: [id number]  Encounter Provider: Ernie Jackson MD  Encounter Date: 2023   Encounter department: [address] [place] [address]  History of asthma  Assessment & Plan:  Had history of asthma and albuterol usage  No exacerbations since   Patient requires clearance for  enrollment  PLAN:   - Will do PFT, if negative will provide clearance     Orders:  -     Complete PFT with post bronchodilator; Future         Subjective     Ac Payton is a 23 y o  male with no significant pmhx presenting today for clearance for [employer]  Patient reports that he had history of asthma, however if order to be cleared for [facility] service he requires a clearance from asthma and using any medications      Review of Systems   Constitutional: Negative for chills and fever  HENT: Negative for ear pain and sore throat  Eyes: Negative for pain and visual disturbance  Respiratory: Negative for cough and shortness of breath  Cardiovascular: Negative for chest pain and palpitations  Gastrointestinal: Negative for abdominal pain and vomiting  Genitourinary: Negative for dysuria and hematuria  Musculoskeletal: Negative for arthralgias and back pain  Skin: Negative for color change and rash  Neurological: Negative for seizures and syncope  All other systems reviewed and are negative  Past Medical History:   Diagnosis Date   • Asthma      Past Surgical History:   Procedure Laterality Date   • NO PAST SURGERIES       No family history on file  Social History     Socioeconomic History   • Marital status: /Civil Union     Spouse name: None   • Number of children: None   • Years of education: None   • Highest education level: None   Occupational History   • None   Tobacco Use   • Smoking status: Never   • Smokeless tobacco: Never   Substance and Sexual Activity   • Alcohol use: No   • Drug use:  Yes Types: Marijuana     Comment: Occasionally   • Sexual activity: None   Other Topics Concern   • None   Social History Narrative   • None     Social Determinants of Health     Financial Resource Strain: Low Risk    • Difficulty of Paying Living Expenses: Not hard at all   Food Insecurity: No Food Insecurity   • Worried About Running Out of Food in the Last Year: Never true   • Ran Out of Food in the Last Year: Never true   Transportation Needs: No Transportation Needs   • Lack of Transportation (Medical): No   • Lack of Transportation (Non-Medical): No   Physical Activity: Not on file   Stress: Not on file   Social Connections: Not on file   Intimate Partner Violence: Not on file   Housing Stability: Not on file     No current outpatient medications on file prior to visit  No Known Allergies  Immunization History   Administered Date(s) Administered   • Influenza, injectable, quadrivalent, preservative free 0 5 mL 01/06/2023   • Rabies, Intramuscular 04/20/2017, 04/23/2017, 04/27/2017   • Tdap 02/08/2016       Objective     /80 (BP Location: Left arm, Patient Position: Sitting, Cuff Size: Standard)   Pulse 74   Temp 98 5 °F (36 9 °C) (Temporal)   Resp 18   Ht 5' 9" (1 753 m)   Wt 69 4 kg (153 lb)   SpO2 98%   BMI 22 59 kg/m²     Physical Exam  Constitutional:       Appearance: Normal appearance  HENT:      Head: Normocephalic and atraumatic  Mouth/Throat:      Mouth: Mucous membranes are moist       Pharynx: Oropharynx is clear  Cardiovascular:      Rate and Rhythm: Normal rate and regular rhythm  Pulses: Normal pulses  Heart sounds: Normal heart sounds  Pulmonary:      Effort: Pulmonary effort is normal       Breath sounds: Normal breath sounds  Abdominal:      General: Bowel sounds are normal       Palpations: Abdomen is soft  Skin:     General: Skin is warm  Neurological:      Mental Status: He is alert and oriented to person, place, and time     Psychiatric: Mood and Affect: Mood normal        Ti Land MD

## 2023-01-27 NOTE — ASSESSMENT & PLAN NOTE
Had history of asthma and albuterol usage  No exacerbations since 2013  Patient requires clearance for  enrollment       PLAN:   - Will do PFT, if negative will provide clearance

## 2023-02-06 ENCOUNTER — HOSPITAL ENCOUNTER (OUTPATIENT)
Dept: PULMONOLOGY | Facility: HOSPITAL | Age: 23
Discharge: HOME | End: 2023-02-06

## 2023-02-06 DIAGNOSIS — Z87.09 HISTORY OF ASTHMA: ICD-10-CM

## 2023-02-06 RX ORDER — ALBUTEROL SULFATE 2.5 MG/3ML
2.5 SOLUTION RESPIRATORY (INHALATION) ONCE
Status: COMPLETED | OUTPATIENT
Start: 2023-02-06 | End: 2023-02-06

## 2023-02-06 RX ADMIN — ALBUTEROL SULFATE 2.5 MG: 2.5 SOLUTION RESPIRATORY (INHALATION) at 08:00
